# Patient Record
Sex: MALE | Race: OTHER
[De-identification: names, ages, dates, MRNs, and addresses within clinical notes are randomized per-mention and may not be internally consistent; named-entity substitution may affect disease eponyms.]

---

## 2022-02-13 ENCOUNTER — TRANSCRIPTION ENCOUNTER (OUTPATIENT)
Age: 37
End: 2022-02-13

## 2022-04-20 ENCOUNTER — APPOINTMENT (OUTPATIENT)
Dept: ORTHOPEDIC SURGERY | Facility: CLINIC | Age: 37
End: 2022-04-20
Payer: COMMERCIAL

## 2022-04-20 VITALS — BODY MASS INDEX: 28.93 KG/M2 | HEIGHT: 66 IN | WEIGHT: 180 LBS

## 2022-04-20 PROBLEM — Z00.00 ENCOUNTER FOR PREVENTIVE HEALTH EXAMINATION: Status: ACTIVE | Noted: 2022-04-20

## 2022-04-20 PROCEDURE — 99214 OFFICE O/P EST MOD 30 MIN: CPT

## 2022-04-20 PROCEDURE — 73110 X-RAY EXAM OF WRIST: CPT | Mod: LT

## 2022-04-20 PROCEDURE — 99204 OFFICE O/P NEW MOD 45 MIN: CPT

## 2022-04-22 NOTE — PHYSICAL EXAM
[Normal Coordination] : normal coordination [Normal DTR UE/LE] : normal DTR UE/LE  [Normal Sensation] : normal sensation [Normal Mood and Affect] : normal mood and affect [Orientated] : orientated [Normal Skin] : normal skin [No Rash] : no rash [No Ulcers] : no ulcers [No Lesions] : no lesions [No obvious lymphadenopathy in areas examined] : no obvious lymphadenopathy in areas examined [NL (150)] : flexion 150 degrees [NL (0)] : extension 0 degrees [TFCC] : TFCC [NL (75)] : Dorsiflexion 75 degrees [NL (85)] : volarflexion 85 degrees [NL (25)] : radial deviation 25 degrees [NL (40)] : ulnar deviation 40 degrees [NL (90)] : supination 90 degrees [5___] : pinch 5[unfilled]/5 [] : good capillary refill in all fingers [Left] : left hand [There are no fractures, subluxations or dislocations. No significant abnormalities are seen] : There are no fractures, subluxations or dislocations. No significant abnormalities are seen

## 2022-04-22 NOTE — HISTORY OF PRESENT ILLNESS
[Intermittent] : intermittent [Rest] : rest [] : no [FreeTextEntry1] : left elbow [FreeTextEntry5] : pt states pain improved since last visit. still having pain in wrist intermittently, usually with lifting items. \par Insurance stopped paying for PT. pt states PT helped a lot. [de-identified] : lifting items [de-identified] : physical therapy

## 2022-04-25 ENCOUNTER — APPOINTMENT (OUTPATIENT)
Dept: MRI IMAGING | Facility: CLINIC | Age: 37
End: 2022-04-25
Payer: COMMERCIAL

## 2022-04-25 PROCEDURE — 73221 MRI JOINT UPR EXTREM W/O DYE: CPT | Mod: LT

## 2022-04-27 ENCOUNTER — APPOINTMENT (OUTPATIENT)
Dept: ORTHOPEDIC SURGERY | Facility: CLINIC | Age: 37
End: 2022-04-27
Payer: COMMERCIAL

## 2022-04-27 VITALS — HEIGHT: 66 IN | BODY MASS INDEX: 28.93 KG/M2 | WEIGHT: 180 LBS

## 2022-04-27 PROCEDURE — 99214 OFFICE O/P EST MOD 30 MIN: CPT

## 2022-04-29 NOTE — DATA REVIEWED
[MRI] : MRI [Wrist] : wrist [Report was reviewed and noted in the chart] : The report was reviewed and noted in the chart [I independently reviewed and interpreted images and report] : I independently reviewed and interpreted images and report [FreeTextEntry1] : 1. Partial tear at the dorsal ulna TFC insertion.\par 2. Partial proximal tear of the dorsal radial and central TFC.\par 3. Ulnar subluxation of the extensor carpi ulnaris with no acute tear of the subsheath and no tenosynovitis.

## 2022-04-29 NOTE — PHYSICAL EXAM
[Normal Coordination] : normal coordination [Normal DTR UE/LE] : normal DTR UE/LE  [Normal Sensation] : normal sensation [Normal Mood and Affect] : normal mood and affect [Orientated] : orientated [Normal Skin] : normal skin [No Rash] : no rash [No Ulcers] : no ulcers [No Lesions] : no lesions [No obvious lymphadenopathy in areas examined] : no obvious lymphadenopathy in areas examined [Left] : left hand [TFCC] : TFCC [NL (75)] : Dorsiflexion 75 degrees [NL (85)] : volarflexion 85 degrees [NL (25)] : radial deviation 25 degrees [NL (40)] : ulnar deviation 40 degrees [NL (90)] : supination 90 degrees [Ulnar Deviation] : ulnar deviation [5___] : pinch 5[unfilled]/5 [] : good capillary refill in all fingers

## 2022-04-29 NOTE — HISTORY OF PRESENT ILLNESS
[Rest] : rest [] : no [FreeTextEntry1] : left wrist  [FreeTextEntry5] : pt reports pain is same last visit. elbow pain is much better, wrist is still painful [de-identified] : movement  [de-identified] : MRI

## 2022-04-29 NOTE — DISCUSSION/SUMMARY
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  The natural history of the pathology was explained in full to the patient in layman's terms.  Several different treatment options were discussed and explained in full to the patient including the risks and benefits of both surgical and non-surgical treatments.  All questions and concerns were answered. \par \par RICE\par I explained to the patient that rest, ice, compression, and elevation would benefit them.  They may return to activity after follow-up or when they no longer have any pain.\par \par NSAIDs - OTC\par Patient is to begin over the counter oral anti-inflammatory medications on an as needed basis, as long as there are no medical contra-indications.  Patient is counseled on possible GI and blood pressure side effects.\par \par \par Pain Guide Activities\par The patient was advised to let pain guide the gradual advancement of activities.\par \par Icing\par The patient was advised to apply ice (wrapped in a towel or protective covering) to the area daily (20 minutes at a time, 2-4X/day).

## 2022-05-03 ENCOUNTER — APPOINTMENT (OUTPATIENT)
Dept: ORTHOPEDIC SURGERY | Facility: CLINIC | Age: 37
End: 2022-05-03
Payer: COMMERCIAL

## 2022-05-03 VITALS — HEIGHT: 66 IN | BODY MASS INDEX: 28.93 KG/M2 | WEIGHT: 180 LBS

## 2022-05-03 DIAGNOSIS — Z78.9 OTHER SPECIFIED HEALTH STATUS: ICD-10-CM

## 2022-05-03 DIAGNOSIS — E78.00 PURE HYPERCHOLESTEROLEMIA, UNSPECIFIED: ICD-10-CM

## 2022-05-03 PROCEDURE — 99214 OFFICE O/P EST MOD 30 MIN: CPT

## 2022-05-03 NOTE — HISTORY OF PRESENT ILLNESS
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 Pain occurs with activities like opening a jar, pushing/lifting something heavy, strong . Denies numbness/tingling.\par Saw Dr. Jeronimo => XR, MRI L wrist.\par Not taking any pain medication for this.\par \par MRI L wrist 4/25/22 - IMPRESSION:\par 1. Partial tear at the dorsal ulna TFC insertion.\par 2. Partial proximal tear of the dorsal radial and central TFC.\par 3. Ulnar subluxation of the extensor carpi ulnaris with no acute tear of the subsheath and no tenosynovitis. \par \par Hx: high cholesterol. [] : no [FreeTextEntry1] : left wrist [FreeTextEntry5] : 05/03/2022: a 35 yo male, presents for left wrist injury sustained when he slipped on an ice on 02/2022. he started experiencing pain. no numbness/tingling/stiffness/swelling.  X-ray showed no fracture.  PT for 2 months with no relief [de-identified] : activities, lifting,pushing [de-identified] : 04/2022 [de-identified] : x-ray and MRI

## 2022-05-03 NOTE — IMAGING
[de-identified] : LEFT hand\par skin intact. no swelling.\par TTP to DRUJ, fovea.\par good wrist extension, flexion. good pronation, supination. \par good EPL, FPL. good finger extension, flex to full fist. good finger abduction and adduction. \par SILT to median, ulnar, radial distribution. \par palpable radial pulse, brisk cap refill all digits.\par no triggering.\par negative Tinel's at carpal tunnel.\par \par no pain with pronosupination. no pain with radial/ulnar deviation.\par DRUJ shear => pain @pronation/supination. no instability.\par + ECU synergy test.\par  [Left] : left wrist [FreeTextEntry8] : @4/20/22: no acute displaced fracture or dislocation.

## 2022-05-03 NOTE — ASSESSMENT
[FreeTextEntry1] : The condition was explained to the patient.\par - prescribed wrist brace, to be worn full time except for hygiene.\par - OTC pain medication, activity modification, ice PRN.\par \par F/u 4wks.

## 2022-05-11 ENCOUNTER — APPOINTMENT (OUTPATIENT)
Dept: ORTHOPEDIC SURGERY | Facility: CLINIC | Age: 37
End: 2022-05-11
Payer: COMMERCIAL

## 2022-05-11 VITALS — BODY MASS INDEX: 28.93 KG/M2 | HEIGHT: 66 IN | WEIGHT: 180 LBS

## 2022-05-11 PROCEDURE — 20605 DRAIN/INJ JOINT/BURSA W/O US: CPT

## 2022-05-11 PROCEDURE — 99214 OFFICE O/P EST MOD 30 MIN: CPT | Mod: 25

## 2022-05-13 NOTE — HISTORY OF PRESENT ILLNESS
[de-identified] : 05/11/2022 \par \rufus GALLAGHER is presenting today for followup. Pain and symptoms are improved with regard to his elbow. However, he has worsening left wrist pain since starting to wear the wrist brace. 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 He is being treated nonoperatively for this. He has been wearing the splint. He denies any numbness/tingling/fevers/chills.  [FreeTextEntry1] : left wrist

## 2022-05-13 NOTE — ASSESSMENT
[FreeTextEntry1] : The risks, benefits, and alternatives to corticosteroid injection were reviewed with the patient. Risks outlined include but are not limited to infection, sepsis, bleeding, scarring, skin discoloration, temporary increase in pain, syncopal episode, failure to resolve symptoms, symptoms recurrence, allergic reaction, flare reaction, and elevation of blood sugar in diabetics. Patient understood the risks and asked to proceed with this treatment course.

## 2022-05-13 NOTE — PROCEDURE
[Left] : of the left [Wrist] : wrist [Pain] : pain [Inflammation] : inflammation [Alcohol] : alcohol [Betadine] : betadine [Ethyl Chloride sprayed topically] : ethyl chloride sprayed topically [Sterile technique used] : sterile technique used [___ cc    6mg] :  Betamethasone (Celestone) ~Vcc of 6mg [___ cc    1%] : Lidocaine ~Vcc of 1%  [___ cc    0.25%] : Bupivacaine (Marcaine) ~Vcc of 0.25%  [] : Patient tolerated procedure well [Call if redness, pain or fever occur] : call if redness, pain or fever occur [Apply ice for 15min out of every hour for the next 12-24 hours as tolerated] : apply ice for 15 minutes out of every hour for the next 12-24 hours as tolerated [Previous OTC use and PT nontherapeutic] : patient has tried OTC's including aspirin, Ibuprofen, Aleve, etc or prescription NSAIDS, and/or exercises at home and/or physical therapy without satisfactory response [Patient had decreased mobility in the joint] : patient had decreased mobility in the joint [Risks, benefits, alternatives discussed / Verbal consent obtained] : the risks benefits, and alternatives have been discussed, and verbal consent was obtained [de-identified] : Left Wrist TFCC injection

## 2022-05-13 NOTE — DISCUSSION/SUMMARY
[de-identified] : Instructions: Dx / Natural History\par The patient was advised of the diagnosis.  The natural history of the pathology was explained in full to the patient in layman's terms.  Several different treatment options were discussed and explained in full to the patient including the risks and benefits of both surgical and non-surgical treatments.  All questions and concerns were answered. \par \par RICE\par I explained to the patient that rest, ice, compression, and elevation would benefit them.  They may return to activity after follow-up or when they no longer have any pain.\par \par NSAIDs - OTC\par Patient is to begin over the counter oral anti-inflammatory medications on an as needed basis, as long as there are no medical contraindications.  Patient is counseled on possible GI and blood pressure side effects.\par \par Pain Guide Activities\par The patient was advised to let pain guide the gradual advancement of activities.\par \par Icing\par The patient was advised to apply ice (wrapped in a towel or protective covering) to the area daily (20 minutes at a time, 2-4X/day).

## 2022-05-13 NOTE — PHYSICAL EXAM
[Normal Coordination] : normal coordination [Normal DTR UE/LE] : normal DTR UE/LE  [Normal Sensation] : normal sensation [Normal Mood and Affect] : normal mood and affect [Orientated] : orientated [Normal Skin] : normal skin [No Rash] : no rash [No Ulcers] : no ulcers [No Lesions] : no lesions [No obvious lymphadenopathy in areas examined] : no obvious lymphadenopathy in areas examined [Left] : left hand [TFCC] : TFCC [Wrist Dorsiflexion] : wrist dorsiflexion [Wrist Volarflexion] : wrist volarflexion [Ulnar Deviation] : ulnar deviation [] : good capillary refill in all fingers

## 2022-05-15 ENCOUNTER — FORM ENCOUNTER (OUTPATIENT)
Age: 37
End: 2022-05-15

## 2022-05-31 ENCOUNTER — APPOINTMENT (OUTPATIENT)
Dept: ORTHOPEDIC SURGERY | Facility: CLINIC | Age: 37
End: 2022-05-31
Payer: COMMERCIAL

## 2022-05-31 VITALS — WEIGHT: 180 LBS | HEIGHT: 66 IN | BODY MASS INDEX: 28.93 KG/M2

## 2022-05-31 PROCEDURE — 99213 OFFICE O/P EST LOW 20 MIN: CPT

## 2022-05-31 NOTE — IMAGING
[Left] : left wrist [FreeTextEntry8] : @4/20/22: no acute displaced fracture or dislocation. [de-identified] : LEFT hand\par skin intact. no swelling.\par TTP to fovea.\par good wrist extension, flexion. good pronation, supination. \par good EPL, FPL. good finger extension, flex to full fist. good finger abduction and adduction. \par SILT to median, ulnar, radial distribution. \par palpable radial pulse, brisk cap refill all digits.\par no triggering.\par \par no pain with pronosupination. no pain with radial/ulnar deviation.\par DRUJ shear => pain @pronation. no instability.\par negative ECU synergy test.\par

## 2022-05-31 NOTE — ASSESSMENT
[FreeTextEntry1] : - transition to wrist widget during the day, wrist brace for sleep.\par - prescribed OT for L wrist. \par - OTC pain medication, activity modification, ice PRN.\par \par F/u 6wks.

## 2022-05-31 NOTE — HISTORY OF PRESENT ILLNESS
[Gradual] : gradual [6] : 6 [3] : 3 [Dull/Aching] : dull/aching [Intermittent] : intermittent [Rest] : rest [Injection therapy] : injection therapy [de-identified] : 5/31/22: f/u LEFT TFCC tear. 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 Pain occurs with activities like opening a jar, pushing/lifting something heavy, strong . Denies numbness/tingling.\par Saw Dr. Jeronimo => XR, MRI L wrist.\par Not taking any pain medication for this.\par \par MRI L wrist 4/25/22 - IMPRESSION:\par 1. Partial tear at the dorsal ulna TFC insertion.\par 2. Partial proximal tear of the dorsal radial and central TFC.\par 3. Ulnar subluxation of the extensor carpi ulnaris with no acute tear of the subsheath and no tenosynovitis. \par \par Hx: high cholesterol. [] : no [FreeTextEntry1] : left wrist [FreeTextEntry9] : Brace [de-identified] : activities, lifting,pushing, brushing his teeth with the left hand [de-identified] : 04/2022 [de-identified] : x-ray and MRI

## 2022-07-06 ENCOUNTER — FORM ENCOUNTER (OUTPATIENT)
Age: 37
End: 2022-07-06

## 2022-07-12 ENCOUNTER — APPOINTMENT (OUTPATIENT)
Dept: ORTHOPEDIC SURGERY | Facility: CLINIC | Age: 37
End: 2022-07-12

## 2022-07-12 VITALS — BODY MASS INDEX: 28.93 KG/M2 | WEIGHT: 180 LBS | HEIGHT: 66 IN

## 2022-07-12 DIAGNOSIS — S69.80XA OTHER SPECIFIED INJURIES OF UNSPECIFIED WRIST, HAND AND FINGER(S), INITIAL ENCOUNTER: ICD-10-CM

## 2022-07-12 PROCEDURE — 99213 OFFICE O/P EST LOW 20 MIN: CPT

## 2022-07-12 NOTE — IMAGING
[de-identified] : LEFT hand\par skin intact. no swelling.\par no TTP.\par good wrist extension, flexion. good pronation, supination. \par good EPL, FPL. good finger extension, flex to full fist. good finger abduction and adduction. \par SILT to median, ulnar, radial distribution. \par palpable radial pulse, brisk cap refill all digits.\par no triggering.\par \par no pain with pronosupination. no pain with radial/ulnar deviation.\par DRUJ shear => min pain @supination. no instability.\par negative ECU synergy test.\par

## 2022-07-12 NOTE — ASSESSMENT
[FreeTextEntry1] : - wrist widget PRN.\par - completed OT. transition to HEP.\par - activity as tolerated.\par \par F/u PRN.

## 2022-07-12 NOTE — HISTORY OF PRESENT ILLNESS
[0] : 0 [Rest] : rest [de-identified] : 7/12/22: f/u LEFT TFCC tear. pain better. wearing wrist widget. completed OT.\par \par 5/31/22: f/u LEFT TFCC tear. 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 Pain occurs with activities like opening a jar, pushing/lifting something heavy, strong . Denies numbness/tingling.\par Saw Dr. Jeronimo => XR, MRI L wrist.\par Not taking any pain medication for this.\par \par MRI L wrist 4/25/22 - IMPRESSION:\par 1. Partial tear at the dorsal ulna TFC insertion.\par 2. Partial proximal tear of the dorsal radial and central TFC.\par 3. Ulnar subluxation of the extensor carpi ulnaris with no acute tear of the subsheath and no tenosynovitis. \par \par Hx: high cholesterol. [] : no [FreeTextEntry1] : left wrist [FreeTextEntry9] : Brace, Occupational therapy, last session was 07/06/2022 [de-identified] : activities, lifting,pushing, brushing his teeth with the left hand [de-identified] : x-ray and MRI

## 2024-10-01 NOTE — DISCUSSION/SUMMARY
[de-identified] : I have personally reviewed all previous images as well as imaging reports.  I have reviewed any previous medical records including physical therapy reports, and reports from referring physicians.  We have come to a probable diagnosis.\par \par The patient was explained the diagnosis and treatment options for the condition in great detail.  We discussed the use of injectable as well as oral medications and steroids, physical therapy, oral and topical anti-inflammatories other modalities including surgical treatment.  \par \par After reviewing all clinical information provided as well as the available imaging studies, I recommended activity modification and avoidance conditions leading to pain.  We discussed the possibility of prescription strength anti-inflammatory medications as well as use of over-the-counter medications.  We also discussed surgical options to treat this problem.\par  \par \par ELBOW IS NEAR 100% RECOVERY, BUT NOW HE STARTED NOTICING ULNAR SIDED WRIST PAIN. IN AREA OF TFCC. \par \par RECOMMEND MRI TO EVAL TYPE OF TFCC TEAR. complains of pain/discomfort